# Patient Record
Sex: FEMALE | Race: WHITE
[De-identification: names, ages, dates, MRNs, and addresses within clinical notes are randomized per-mention and may not be internally consistent; named-entity substitution may affect disease eponyms.]

---

## 2018-08-23 ENCOUNTER — HOSPITAL ENCOUNTER (OUTPATIENT)
Dept: HOSPITAL 74 - JASU-SURG | Age: 38
Discharge: HOME | End: 2018-08-23
Attending: OBSTETRICS & GYNECOLOGY
Payer: COMMERCIAL

## 2018-08-23 VITALS — HEART RATE: 60 BPM | DIASTOLIC BLOOD PRESSURE: 64 MMHG | SYSTOLIC BLOOD PRESSURE: 92 MMHG

## 2018-08-23 VITALS — TEMPERATURE: 97.8 F

## 2018-08-23 VITALS — BODY MASS INDEX: 19.8 KG/M2

## 2018-08-23 DIAGNOSIS — N84.0: Primary | ICD-10-CM

## 2018-08-23 PROCEDURE — 0UB98ZX EXCISION OF UTERUS, VIA NATURAL OR ARTIFICIAL OPENING ENDOSCOPIC, DIAGNOSTIC: ICD-10-PCS | Performed by: OBSTETRICS & GYNECOLOGY

## 2018-08-23 PROCEDURE — 0UDB8ZZ EXTRACTION OF ENDOMETRIUM, VIA NATURAL OR ARTIFICIAL OPENING ENDOSCOPIC: ICD-10-PCS | Performed by: OBSTETRICS & GYNECOLOGY

## 2018-08-23 NOTE — OP
Operative Note





- Note:


Operative Date: 08/23/18


Pre-Operative Diagnosis: Endometrial polyps


Operation: Hysteroscopic myomectomy.  Suction DC


Findings: 





endometrial polyps seen


Post-Operative Diagnosis: Same as Pre-op


Surgeon: Sheyla Andre


Anesthesia: General


Estimated Blood Loss (mls): 10


Operative Report Dictated: Yes

## 2018-08-23 NOTE — HP
History & Physical Update





- History


History: No Change





- Physical


Physical: No Change





- Assessment


Assessment: No Change





- Plan


Plan: No Change (No changed in HP)

## 2018-08-24 NOTE — PATH
Surgical Pathology Report



Patient Name:  AMMY ATKINS

Accession #:  [id number]

Med. Rec. #:  [id number]                                                        

   /Age/Gender:  1980 (Age: 37) / F

Account:  [id number]                                                          

             Location: [facility] SURG[facility]

Taken:  2018

Received:  2018

Reported:  2018

Physicians:  Sheyla nAdre M.D.

  



Specimen(s) Received

 UTERINE CONTENTS 





Clinical History

Endometrial polyp







Final Diagnosis

CONTENTS OF UTERUS, DILATION AND CURETTAGE:

FRAGMENTS OF ENDOMETRIAL POLYP.





***Electronically Signed***

Zena Ely M.D.





Gross Description

Received in formalin labeled "contents of uterus," is a 4.5 x 3.0 x 0.4 cm

aggregate of tan soft tissue fragments. The formalin is filtered and the

specimen is entirely submitted in 2 cassettes.

/2018



saudi

## 2018-09-13 NOTE — OP
DATE OF OPERATION:  08/23/2018

 

PREOPERATIVE DIAGNOSIS:  Endometrial polyps.

 

OPERATION:

1.  Hysteroscopic myomectomy. 

2.  Fractional dilatation and curettage.

 

POSTOPERATIVE DIAGNOSIS:  Endometrial polyps.

 

SURGEON:  Sheyla Andre MD

 

ANESTHESIA:  General.

 

ESTIMATED BLOOD LOSS:  10 mL.

 

DESCRIPTION OF PROCEDURE:    Patient was taken to the operating room, placed in the

dorsal lithotomy position, prepped and draped in the usual sterile fashion.  A

timeout was performed in accordance with hospital regulation.  Speculum was placed in

the vagina.  Anterior lip of the cervix was grasped with a single-toothed tenaculum. 

Cervix was then dilated to accommodate the operative hysteroscope.  The operative

hysteroscope was then inserted, and endometrial polyps were seen.  Cautery and

cutting of the endometrial polyps were then done followed by suction dilatation and

curettage.  All instruments were then removed.  The patient tolerated the procedure

well and was taken to recovery room in stable condition.

 

 

SHEYLA ANDRE M.D.

 

REINALDO/[id number]

DD: 09/13/2018 11:38

DT: 09/13/2018 12:41

Job #:  [id number]